# Patient Record
Sex: FEMALE
[De-identification: names, ages, dates, MRNs, and addresses within clinical notes are randomized per-mention and may not be internally consistent; named-entity substitution may affect disease eponyms.]

---

## 2023-01-29 ENCOUNTER — HOSPITAL ENCOUNTER (EMERGENCY)
Dept: HOSPITAL 90 - EDH | Age: 33
Discharge: HOME | End: 2023-01-29
Payer: COMMERCIAL

## 2023-01-29 VITALS — HEIGHT: 60 IN | WEIGHT: 293 LBS | BODY MASS INDEX: 57.52 KG/M2

## 2023-01-29 VITALS — SYSTOLIC BLOOD PRESSURE: 121 MMHG | DIASTOLIC BLOOD PRESSURE: 95 MMHG
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
## 2023-01-29 DIAGNOSIS — M54.50: ICD-10-CM

## 2023-01-29 DIAGNOSIS — S50.12XA: ICD-10-CM
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
## 2023-01-29 DIAGNOSIS — S00.03XA: ICD-10-CM

## 2023-01-29 DIAGNOSIS — S20.212A: ICD-10-CM

## 2023-01-29 DIAGNOSIS — S57.81XA: Primary | ICD-10-CM

## 2023-01-29 LAB
APPEARANCE UR: (no result)
BACTERIA URNS QL MICRO: (no result) /HPF
BILIRUB UR QL STRIP: NEGATIVE MG/DL
COLOR UR: YELLOW
DEPRECATED SQUAMOUS URNS QL MICRO: (no result) /HPF (ref 0–2)
GLUCOSE UR STRIP-MCNC: NEGATIVE MG/DL
HGB UR QL STRIP: (no result)
KETONES UR STRIP-MCNC: 5 MG/DL
LEUKOCYTE ESTERASE UR QL STRIP: 500 LEU/UL
MUCOUS THREADS URNS QL MICRO: (no result) LPF
NITRITE UR QL STRIP: NEGATIVE
PH UR STRIP: 6.5 [PH] (ref 5–8)
PROT UR QL STRIP: 20 MG/DL
SP GR UR STRIP: 1.02 (ref 1–1.03)
UROBILINOGEN UR STRIP-MCNC: 0.2 MG/DL (ref 0.2–1)

## 2023-01-29 PROCEDURE — 96372 THER/PROPH/DIAG INJ SC/IM: CPT

## 2023-01-29 PROCEDURE — 73080 X-RAY EXAM OF ELBOW: CPT

## 2023-01-29 PROCEDURE — 73090 X-RAY EXAM OF FOREARM: CPT

## 2023-01-29 PROCEDURE — 72131 CT LUMBAR SPINE W/O DYE: CPT

## 2023-01-29 PROCEDURE — 81025 URINE PREGNANCY TEST: CPT

## 2023-01-29 PROCEDURE — 87186 SC STD MICRODIL/AGAR DIL: CPT

## 2023-01-29 PROCEDURE — 81001 URINALYSIS AUTO W/SCOPE: CPT

## 2023-01-29 PROCEDURE — 87088 URINE BACTERIA CULTURE: CPT

## 2023-01-29 PROCEDURE — 99285 EMERGENCY DEPT VISIT HI MDM: CPT

## 2023-01-29 PROCEDURE — 87077 CULTURE AEROBIC IDENTIFY: CPT

## 2023-01-29 PROCEDURE — 71045 X-RAY EXAM CHEST 1 VIEW: CPT

## 2024-12-06 ENCOUNTER — HOSPITAL ENCOUNTER (EMERGENCY)
Dept: HOSPITAL 90 - EDH | Age: 34
Discharge: HOME | End: 2024-12-06
Payer: COMMERCIAL

## 2024-12-06 VITALS
HEART RATE: 62 BPM | DIASTOLIC BLOOD PRESSURE: 61 MMHG | RESPIRATION RATE: 14 BRPM | SYSTOLIC BLOOD PRESSURE: 132 MMHG | OXYGEN SATURATION: 98 %

## 2024-12-06 VITALS — BODY MASS INDEX: 44.41 KG/M2 | WEIGHT: 293 LBS | HEIGHT: 68 IN

## 2024-12-06 VITALS — TEMPERATURE: 98.4 F

## 2024-12-06 DIAGNOSIS — M79.606: Primary | ICD-10-CM

## 2024-12-06 DIAGNOSIS — Z79.899: ICD-10-CM

## 2024-12-06 DIAGNOSIS — Z98.890: ICD-10-CM

## 2024-12-06 DIAGNOSIS — G25.81: ICD-10-CM

## 2024-12-06 DIAGNOSIS — G25.3: ICD-10-CM

## 2024-12-06 DIAGNOSIS — D50.9: ICD-10-CM

## 2024-12-06 LAB
APPEARANCE UR: (no result)
BACTERIA URNS QL MICRO: (no result) /HPF
BASOPHILS # BLD AUTO: 0.04 K/UL (ref 0–0.2)
BASOPHILS NFR BLD AUTO: 0.4 % (ref 0–5)
BILIRUB UR QL STRIP: NEGATIVE MG/DL
BUN SERPL-MCNC: 18 MG/DL (ref 7–18)
CASTS URNS QL MICRO: 1 /LPF
CHLORIDE SERPL-SCNC: 104 MMOL/L (ref 101–111)
CK SERPL-CCNC: 137 U/L (ref 21–232)
CO2 SERPL-SCNC: 31 MMOL/L (ref 21–32)
COLOR UR: (no result)
CREAT SERPL-MCNC: 0.9 MG/DL (ref 0.5–1)
DEPRECATED SQUAMOUS URNS QL MICRO: (no result) /HPF (ref 0–2)
EOSINOPHIL # BLD AUTO: 0.24 K/UL (ref 0–0.7)
EOSINOPHIL NFR BLD AUTO: 2.4 % (ref 0–8)
ERYTHROCYTE [DISTWIDTH] IN BLOOD BY AUTOMATED COUNT: 18.8 % (ref 11–15.5)
GFR SERPL CREATININE-BSD FRML MDRD: 85 ML/MIN (ref 90–?)
GLUCOSE SERPL-MCNC: 93 MG/DL (ref 70–105)
GLUCOSE UR STRIP-MCNC: NEGATIVE MG/DL
HCT VFR BLD AUTO: 33.7 % (ref 36–48)
HGB UR QL STRIP: (no result)
IMM GRANULOCYTES # BLD: 0.03 K/UL (ref 0–1)
KETONES UR STRIP-MCNC: NEGATIVE MG/DL
LEUKOCYTE ESTERASE UR QL STRIP: 500 LEU/UL
LYMPHOCYTES # SPEC AUTO: 1.5 K/UL (ref 1–4.8)
LYMPHOCYTES NFR SPEC AUTO: 14.6 % (ref 21–51)
MCH RBC QN AUTO: 20.8 PG (ref 27–33)
MCHC RBC AUTO-ENTMCNC: 29.4 G/DL (ref 32–36)
MCV RBC AUTO: 70.8 FL (ref 79–99)
MICRO URNS: YES
MONOCYTES # BLD AUTO: 0.8 K/UL (ref 0.1–1)
MONOCYTES NFR BLD AUTO: 7.9 % (ref 3–13)
MUCOUS THREADS URNS QL MICRO: (no result) LPF
NEUTROPHILS # BLD AUTO: 7.5 K/UL (ref 1.8–7.7)
NEUTROPHILS NFR BLD AUTO: 74.4 % (ref 40–77)
NITRITE UR QL STRIP: NEGATIVE
NRBC BLD MANUAL-RTO: 0 % (ref 0–0.19)
PH UR STRIP: 6.5 [PH] (ref 5–8)
PLATELET # BLD AUTO: 446 K/UL (ref 130–400)
POTASSIUM SERPL-SCNC: 4.1 MMOL/L (ref 3.5–5.1)
PROT UR QL STRIP: NEGATIVE MG/DL
RBC # BLD AUTO: 4.76 MIL/UL (ref 4–5.5)
RBC #/AREA URNS HPF: (no result) /HPF (ref 0–1)
RBC MORPH BLD: (no result)
SODIUM SERPL-SCNC: 140 MMOL/L (ref 136–145)
SP GR UR STRIP: 1.02 (ref 1–1.03)
UROBILINOGEN UR STRIP-MCNC: 0.2 MG/DL (ref 0.2–1)
WBC # BLD AUTO: 10.1 K/UL (ref 4.8–10.8)
WBC #/AREA URNS HPF: (no result) /HPF (ref 0–1)

## 2024-12-06 PROCEDURE — 82550 ASSAY OF CK (CPK): CPT

## 2024-12-06 PROCEDURE — 96360 HYDRATION IV INFUSION INIT: CPT

## 2024-12-06 PROCEDURE — 85025 COMPLETE CBC W/AUTO DIFF WBC: CPT

## 2024-12-06 PROCEDURE — 36415 COLL VENOUS BLD VENIPUNCTURE: CPT

## 2024-12-06 PROCEDURE — 99283 EMERGENCY DEPT VISIT LOW MDM: CPT

## 2024-12-06 PROCEDURE — 87086 URINE CULTURE/COLONY COUNT: CPT

## 2024-12-06 PROCEDURE — 81001 URINALYSIS AUTO W/SCOPE: CPT

## 2024-12-06 PROCEDURE — 80048 BASIC METABOLIC PNL TOTAL CA: CPT

## 2024-12-06 RX ADMIN — MAGNESIUM OXIDE TAB 400 MG (241.3 MG ELEMENTAL MG) ONE MG: 400 (241.3 MG) TAB at 07:52

## 2024-12-06 RX ADMIN — SODIUM CHLORIDE ONE MLS/HR: 0.9 INJECTION, SOLUTION INTRAVENOUS at 07:53

## 2024-12-06 NOTE — ERN
ED Note


History of Present Illness


Stated Complaint:  C/O CRAMPING TO LEGS X 2 DAYS


Chief Complaint:  Lower Extremity Pain/Injury


Time Seen by MD:  06:46


Dictation:


This is a 34-year-old transgender male in transition currently on testosterone 

therapy who presented to the emergency room with complaints of bilateral lower 

extremity cramping for the past 2 days.  Who was worse last night and she could 

not sleep and hence she came in for further evaluation.  She stated that she 

always had Charley horses but last night it was worse she is a  

stood all day as she was extremely busy at work.  She reports that her urine is 

very dark


Temperature 98.1 pulse 75 respirations 20 blood pressure 132/70 with a pulse 

oximetry of 95% on room air


Allergies:  


Coded Allergies:  


     No Known Allergies (Unverified  Allergy, Unknown, 1/29/23)


Home Meds


Active Scripts


Cyclobenzaprine HCl (Cyclobenzaprine HCl) 10 Mg Tablet, 10 MG PO TID PRN for 

PAIN, #15 TAB 0 Refills


   Prov:BRIEN FINE MD         1/29/23


Ibuprofen (Ibuprofen) 600 Mg Tablet, 600 MG PO Q6H PRN for PAIN, #30 TAB 0 

Refills


   Prov:BRIEN FINE MD         1/29/23





Past Medical History


Past Medical History:  No Pertinent History


Surgical History:  Other


Surgical History Other:  LEFT EYE SX


Family History:  Negative


Pregnancy History:  Not Applicable


LMP:  Nov 18, 2024


RN Note Reviewed/Agreed w/PFSH:  Yes





Review of System


Dictation


Constitutional: Negative for fever,chills, and weight loss


Eyes: Negative for injury, pain,redness, and discharge


ENT: Negative for injury,pain or swelling


Cardiovascular: Negative for chest pain, palpitations, and edema


Respiratory: Negative for shortness of breath, cough, and wheezing, 


Abdomen/GI: Negative for abdominal pain, nausea, vomiting, diarrhea, and 

constipation


Back: Negative for injury and pain


: Negative for injury, bleeding and discharge


MS/Extremity: Negative for injury and deformity, cramping of lower extremities


Skin: Negative for rash, and discoloration


Neuro: Negative for headache, weakness, numbness, tingling, and seizure 


Psych: Negative for suicide ideation, homicidal ideation, and hallucinations





Initial Vital Sign


VS





                                   Vital Signs








  Date Time  Temp Pulse Resp B/P (MAP) Pulse Ox O2 Delivery O2 Flow Rate FiO2


 


12/6/24 06:13 98.1 75 20 132/70 98 Room Air  


 


12/6/24 06:29       0 21











Physical Exam


Dictation


General: awake, alert, NAD 


Head/Face: Normocephalic, atraumatic


Eyes: PERRL, EOMI, vision at baseline


ENT: oral cavity clear, TMs clear, no signs of infection


Neck: Trachea midline, supple, no nuchal rigidity


Cardiovascular: RRR, normal S1/S2, No MRGs, no JVD


Respiratory: CTAB, no respiratory distress, No rales or wheezes


Abdomen: Soft, non-tender, non-distended, normal bowel sounds, no guarding or 

rebound.


Skin: Warm, dry, normal turgor, no rash


MS/Extremity: Pulses equal, no cyanosis, neurovascular intact, FROM


Neuro: COAx4, GCS 15, strength 5/5, CN 2-12 intact, normal cerebellar exam, 

normal gait,


Psych: Normal behavior, mood, and affect normal


Extremities-trace edema without any palpable cords, Homans sign is negative





Results (Laboratory/Radiology)


Laboratory/Radiology





Laboratory Tests








Test


 12/6/24


07:20 12/6/24


07:47


 


Urine Color


 LIGHT-YELLOW


(YELLOW) 





 


Urine Appearance


 CLOUDY (CLEAR)


H 





 


Urine pH 6.5 (5.0-8.0)   


 


Urine Specific Gravity


 1.017


(1.001-1.031) 





 


Urine Protein


 NEGATIVE mg/dL


(NEGATIVE) 





 


Urine Glucose (UA)


 NEGATIVE mg/dL


(NEGATIVE) 





 


Urine Ketones


 NEGATIVE mg/dL


(NEGATIVE) 





 


Urine Occult Blood


 +- (TRACE)


(NEGATIVE)  H 





 


Urine Nitrate


 NEGATIVE


(NEGATIVE) 





 


Urine Bilirubin


 NEGATIVE mg/dL


(NEGATIVE) 





 


Urine Urobilinogen


 0.2 mg/dL


(0.2-1.0) 





 


Urine Leukocyte Esterase


 500 Carson/uL


(NEGATIVE)  H 





 


White Blood Count


 


 10.1 K/uL


(4.8-10.8)


 


Red Blood Count


 


 4.76 MIL/uL


(4.00-5.50)


 


Hemoglobin


 


 9.9 g/dL


(12.0-16.0)  L


 


Hematocrit


 


 33.7 % (36-48)


L


 


Mean Corpuscular Volume


 


 70.8 fL


(79-99)  L


 


Mean Corpuscular Hemoglobin


 


 20.8 pg


(27.0-33.0)  L


 


Mean Corpuscular Hemoglobin


Concent 


 29.4 g/dL


(32.0-36.0)  L


 


Red Cell Distribution Width


 


 18.8 %


(11.0-15.5)  H


 


Platelet Count


 


 446 K/uL


(130-400)  H


 


Mean Platelet Volume


 


 8.7 fL


(7.5-10.5)


 


Immature Granulocyte % (Auto)  0.3 % (0-1)  


 


Neutrophils (%) (Auto)


 


 74.4 %


(40.0-77.0)


 


Lymphocytes (%) (Auto)


 


 14.6 %


(21.0-51.0)  L


 


Monocytes (%) (Auto)


 


 7.9 %


(3.0-13.0)


 


Eosinophils (%) (Auto)


 


 2.4 %


(0.0-8.0)


 


Basophils (%) (Auto)


 


 0.4 %


(0.0-5.0)


 


Neutrophils # (Auto)


 


 7.5 K/uL


(1.8-7.7)


 


Lymphocytes # (Auto)


 


 1.5 K/uL


(1.0-4.8)


 


Monocytes # (Auto)


 


 0.8 K/uL


(0.1-1.0)


 


Eosinophils # (Auto)


 


 0.24 K/uL


(0.00-0.70)


 


Basophils # (Auto)


 


 0.04 K/uL


(0.00-0.20)


 


Absolute Immature Granulocyte


(auto 


 0.03 K/uL


(0-1)


 


Nucleated Red Blood Cells


 


 0.0 %


(0.0-0.19)


 


Sodium Level


 


 140 mmol/L


(136-145)


 


Potassium Level


 


 4.1 mmol/L


(3.5-5.1)


 


Chloride Level


 


 104 mmol/L


(101-111)


 


Carbon Dioxide Level


 


 31 mmol/L


(21-32)


 


Blood Urea Nitrogen


 


 18 mg/dL


(7-18)


 


Creatinine


 


 0.9 mg/dL


(0.5-1.0)


 


Glomerular Filtration Rate


Calc 


 85 mL/min


(>90)


 


Random Glucose


 


 93 mg/dL


()


 


Total Calcium


 


 8.7 mg/dL


(8.5-10.1)


 


Total Creatine Kinase


 


 137 U/L


()








Labs Reviewed?:  Yes





ED Course


ED Course





Orders








Procedure Category Date Status





  Time 


 


Magnesium Oxide PHA 12/6/24 Complete





 (Mag-Ox)  07:00 


 


Ropinirole Hcl PHA 12/6/24 Complete





 (Ropinirole Hcl)  07:00 


 


Ropinirole Hcl PHA 12/6/24 Complete





 (Ropinirole Hcl)  07:00 


 


Basic Metabolic Panel LAB 12/6/24 Complete





  07:03 


 


Urinalysis Profile LAB 12/6/24 In Process





  07:03 


 


Cbc With Differential LAB 12/6/24 In Process





  07:22 


 


0.9%Nacl 1000ml (Ns PHA 12/6/24 Complete





1000ml)  07:30 


 


Culture Urine ÓSCAR 12/6/24 In Process





  07:53 


 


Creatine Kinase, Total LAB 12/6/24 Complete





  07:47 








Current Medications








 Medications


  (Trade)  Dose


 Ordered  Sig/Kita


 Route


 PRN Reason  Start Time


 Stop Time Status Last Admin


Dose Admin


 


 Magnesium Oxide


  (Mag-Ox)  400 mg  ONCE  ONCE


 PO


   12/6/24 07:00


 12/6/24 07:01 DC 12/6/24 07:52





 


 Ropinirole HCl


  (ropiNIRole HCL)  0.25 mg  ONCE


 PO


   12/6/24 07:00


 12/6/24 06:54 DC  





 


 Ropinirole HCl


  (ropiNIRole HCL)  0.25 mg  ONCE  ONCE


 PO


   12/6/24 07:00


 12/6/24 07:01 DC 12/6/24 07:52





 


 Sodium Chloride  1,000 ml @ 


 0 mls/hr  ONCE  ONCE


 IV


   12/6/24 07:30


 12/6/24 07:31 DC 12/6/24 07:53











Vital Signs








  Date Time  Temp Pulse Resp B/P (MAP) Pulse Ox O2 Delivery O2 Flow Rate FiO2


 


12/6/24 08:02  62 14 132/61 98 Room Air* 0 21


 


12/6/24 06:29 98.4 76 18 132/61 100 Room Air* 0 21


 


12/6/24 06:13 98.1 75 20 132/70 98 Room Air  





We will perform diagnostic labs,  and administer medications according to the 

patient's complaint.  Once the results are available, will review and personally

interpreted the labs to rule out any acute life-threatening emergency the trach 

require immediate intervention and treatment.  I will then re-evaluate the 

patient after treatment and diagnostic exams have return to determine whether 

the patient requires any further testing, can safely be discharged home or need 

further admission to hospital for additional treatment and evaluation.





Medical Decision Making


MDM


MDM: 


Differential diagnosis:  Electrolyte abnormalities including hypomagnesemia or 

hypokalemia, nocturnal myoclonus, restless leg syndrome


Rationale: Tests considered and ordered secondary to shared decision making 

include:


Previous outside records reviewed: Old ER visits.


Risk of complication and/or morbidity or mortality of patient management: None


Medications-Per medication reconciliation


Need for hospitalization: Patient does not meet criteria for hospitalization.


Need for emergency major/minor surgery: No





There are no social concerns with this patient.





Prescription drug management


Prescriptions will include symptomatic care





Patient's prior external medical records from other ER visits were reviewed by 

me as indicated.  Prior testing and results from previous visits were reviewed. 

Prior tests were taken into account with medical decision making and resource 

utilization, independent historian/historians were used to obtain complete 

medical history.





I independently interpreted the test that were performed, results were reviewed 

by me and considered findings on radiology if ordered.





Medical management and examination interpretation discussions were had by me 

with other qualified healthcare professionals as indicated for the patient's 

care.





Problem List


Problem List:  


(1) Lower extremity myoclonus


(2) Restless leg syndrome


(3) Cramps of lower extremity





DX & DISP


Disposition:  Discharge


Departure


Impression:  


   Primary Impression:  Cramps of lower extremity


   Additional Impressions:  Restless leg syndrome, Lower extremity myoclonus, 

   Microcytic anemia


Condition:  Stable


Scripts


Cephalexin Monohydrate (Keflex) 500 Mg Cap


1 CAP PO BID for 7 Days, #14 CAP 0 Refills


   Prov: DEEPTHI GALEAS MD         12/6/24 


Docusate Sodium (Colace) 100 Mg Capsule


1 CAP PO BID for 15 Days, #30 CAP 0 Refills


   Prov: DEEPTHI GALEAS MD         12/6/24 


Ferrous Sulfate (Ferrous Sulfate) 325 Mg (65 Mg Iron) Tablet


1 TAB PO DAILY for 15 Days, #15 TAB 0 Refills


   Prov: DEEPTHI GALEAS MD         12/6/24





Additional Instructions:  


Patient and the caregiver have been informed of all the diagnostic tests and the

imaging conducted during the today's visit to the emergency room and has 

verbalized understanding of the results I have personally reviewed and 

interpreted all diagnostic exams performed here in the ER today as well as the 

vital signs documented by the nursing staff.  The patient is now being 

discharged to home and should follow up with the primary care physician or the 

specialist as directed by the ER staff.





Follow-up with primary care provider in 1 to 2 days.  Take medications as 

directed here in the emergency room.  Okay to continue home medications unless 

otherwise discussed during your visit in the emergency room today.  Return to 

your nearest emergency room if symptoms worsen or if there is no improvement.  

Call 911 if you need immediate assistance.  Take Tylenol or Motrin 

over-the-counter as needed and if no contraindications are present.  Increase 

oral hydration.  A wound culture or urine culture was ordered here in the 

emergency room department please follow-up with primary care provider and advise

them to get repeat ports from our facility.  If you had any Ace wrap/splints 

that were applied here, please do not remove them until you see your primary 

care or specialty.





Take iron supplement (ferrous sulfate) with a drink containing vitamin C, such 

as orange juice, to help your body absorb iron.  Take colace as needed for 

constipation caused by iron supplementation.  Eat foods rich in iron and vitamin

C.  Rest as needed, as anemia can make you more tired than usual.  Follow up 

with your PCP.


Take antibiotic (Keflex) twice daily for 7 days.  Drink plenty of fluids.  

Urinate often.


Referrals:  


NONE (PCP)


Time of Disposition:  08:43


I have reviewed


I have reviewed the case


I have examined patient











ANCELMO DICKINSON MD             Dec 6, 2024 06:52


DEEPTHI GALEAS MD                    Dec 6, 2024 08:56